# Patient Record
Sex: FEMALE | Race: WHITE | NOT HISPANIC OR LATINO | Employment: STUDENT | URBAN - METROPOLITAN AREA
[De-identification: names, ages, dates, MRNs, and addresses within clinical notes are randomized per-mention and may not be internally consistent; named-entity substitution may affect disease eponyms.]

---
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
## 2023-05-22 ENCOUNTER — PATIENT OUTREACH (OUTPATIENT)
Dept: ADMINISTRATIVE | Facility: HOSPITAL | Age: 23
End: 2023-05-22

## 2024-04-16 ENCOUNTER — HOSPITAL ENCOUNTER (EMERGENCY)
Facility: HOSPITAL | Age: 24
Discharge: HOME | End: 2024-04-16
Attending: EMERGENCY MEDICINE
Payer: COMMERCIAL

## 2024-04-16 VITALS
RESPIRATION RATE: 19 BRPM | TEMPERATURE: 98 F | HEIGHT: 61 IN | SYSTOLIC BLOOD PRESSURE: 133 MMHG | HEART RATE: 88 BPM | BODY MASS INDEX: 23.6 KG/M2 | DIASTOLIC BLOOD PRESSURE: 90 MMHG | WEIGHT: 125 LBS | OXYGEN SATURATION: 98 %

## 2024-04-16 DIAGNOSIS — M25.512 LEFT SHOULDER PAIN: ICD-10-CM

## 2024-04-16 DIAGNOSIS — T14.8XXA MUSCLE STRAIN: Primary | ICD-10-CM

## 2024-04-16 LAB
B-HCG UR QL: NEGATIVE
CTP QC/QA: YES

## 2024-04-16 PROCEDURE — 81025 URINE PREGNANCY TEST: CPT | Mod: ER

## 2024-04-16 PROCEDURE — 96372 THER/PROPH/DIAG INJ SC/IM: CPT

## 2024-04-16 PROCEDURE — 63600175 PHARM REV CODE 636 W HCPCS: Mod: ER

## 2024-04-16 PROCEDURE — 25000003 PHARM REV CODE 250: Mod: ER

## 2024-04-16 PROCEDURE — 99284 EMERGENCY DEPT VISIT MOD MDM: CPT | Mod: 25,ER

## 2024-04-16 RX ORDER — KETOROLAC TROMETHAMINE 30 MG/ML
15 INJECTION, SOLUTION INTRAMUSCULAR; INTRAVENOUS
Status: COMPLETED | OUTPATIENT
Start: 2024-04-16 | End: 2024-04-16

## 2024-04-16 RX ORDER — LIDOCAINE 50 MG/G
1 PATCH TOPICAL DAILY
Qty: 15 PATCH | Refills: 0 | Status: SHIPPED | OUTPATIENT
Start: 2024-04-16

## 2024-04-16 RX ORDER — MELOXICAM 15 MG/1
15 TABLET ORAL DAILY
Qty: 14 TABLET | Refills: 0 | Status: SHIPPED | OUTPATIENT
Start: 2024-04-16 | End: 2024-04-30

## 2024-04-16 RX ORDER — LIDOCAINE 50 MG/G
1 PATCH TOPICAL
Status: DISCONTINUED | OUTPATIENT
Start: 2024-04-16 | End: 2024-04-16 | Stop reason: HOSPADM

## 2024-04-16 RX ORDER — ORPHENADRINE CITRATE 100 MG/1
100 TABLET, EXTENDED RELEASE ORAL 2 TIMES DAILY
Qty: 20 TABLET | Refills: 0 | Status: SHIPPED | OUTPATIENT
Start: 2024-04-16 | End: 2024-04-26

## 2024-04-16 RX ADMIN — LIDOCAINE 1 PATCH: 700 PATCH TOPICAL at 01:04

## 2024-04-16 RX ADMIN — KETOROLAC TROMETHAMINE 15 MG: 30 INJECTION, SOLUTION INTRAMUSCULAR at 01:04

## 2024-04-16 NOTE — DISCHARGE INSTRUCTIONS
You were seen in the emergency department today for left shoulder pain and were diagnosed with muscle strain.  Please take meloxicam, Norflex, and use Lidoderm patches as prescribed for symptoms.  Please rest the next 2-3 days and allow medicine to work.  Drink plenty of fluids.  You may advance workouts as you tolerate it.  It is important to remember that some problems are difficult to diagnose and may not be found during your Emergency Department visit. Be sure to follow up with your primary care doctor and review all labs/imaging/tests that were performed during this visit with them. Some labs/tests may be outside of the normal range and require non-emergent follow-up and further investigation to help diagnose/exclude/prevent complications or other medical conditions. Return to the emergency department for any new or worsening symptoms. Thank you for allowing me to care for you today, it was my pleasure. I hope you get to feeling better soon!

## 2024-04-16 NOTE — ED PROVIDER NOTES
Encounter Date: 4/16/2024       History     Chief Complaint   Patient presents with    Shoulder Pain      Awoke  this am  with left shoulder  soreness- took  ibuprofen  without  relief     Patient is a 23-year-old female with no past medical history who presents to the emergency department for evaluation of atraumatic nonradiating left shoulder pain that began upon awakening this morning.  She does report working out yesterday but did not work out her arms.  She normally works out, this is nothing new.  Denies any other new activity or strenuous activities.  Denies numbness or tingling.  Denies redness or swelling.  Denies fever, chills, nausea, vomiting.  No other complaints.    The history is provided by the patient.     Review of patient's allergies indicates:  No Known Allergies  Past Medical History:   Diagnosis Date    Concussion with no loss of consciousness 12/16/2018    Skin picking habit 9/5/2014     No past surgical history on file.  No family history on file.  Social History     Tobacco Use    Smoking status: Never    Smokeless tobacco: Never   Substance Use Topics    Alcohol use: No    Drug use: No     Review of Systems   Constitutional:  Negative for chills and fever.   Respiratory:  Negative for shortness of breath.    Cardiovascular:  Negative for chest pain.   Gastrointestinal:  Negative for abdominal pain, nausea and vomiting.   Musculoskeletal:  Positive for arthralgias. Negative for joint swelling, neck pain and neck stiffness.   Skin:  Negative for color change.   Neurological:  Negative for dizziness, light-headedness, numbness and headaches.       Physical Exam     Initial Vitals [04/16/24 1230]   BP Pulse Resp Temp SpO2   (!) 133/90 96 18 97.9 °F (36.6 °C) 99 %      MAP       --         Physical Exam    Nursing note and vitals reviewed.  Constitutional: She appears well-developed and well-nourished.   HENT:   Head: Normocephalic and atraumatic.   Right Ear: External ear normal.   Left Ear:  External ear normal.   Neck: Carotid bruit is not present.   Normal range of motion.  Cardiovascular:  Normal rate, regular rhythm, normal heart sounds and intact distal pulses.     Exam reveals no gallop and no friction rub.       No murmur heard.  Pulmonary/Chest: Breath sounds normal. No respiratory distress. She has no wheezes. She has no rhonchi. She has no rales.   Abdominal: Abdomen is soft. Bowel sounds are normal. She exhibits no distension. There is no abdominal tenderness. There is no rebound and no guarding.   Musculoskeletal:         General: Normal range of motion.      Cervical back: Normal range of motion.      Comments: There is normal passive range of motion of the left shoulder.  There is tenderness over the left trapezius muscle.  No obvious deformity, swelling, erythema when compared to contralateral upper extremity.  Sensation intact.  Neurovascularly intact.     Neurological: She is alert and oriented to person, place, and time. GCS score is 15. GCS eye subscore is 4. GCS verbal subscore is 5. GCS motor subscore is 6.   Psychiatric: She has a normal mood and affect.         ED Course   Procedures  Labs Reviewed   POCT URINE PREGNANCY          Imaging Results              X-Ray Shoulder Trauma Left (Final result)  Result time 04/16/24 13:12:06      Final result by Dwain Joseph MD (04/16/24 13:12:06)                   Impression:      1. No acute displaced fracture, dislocation or suspicious osseous lesion.      Electronically signed by: Dwain Joseph  Date:    04/16/2024  Time:    13:12               Narrative:    EXAMINATION:  XR SHOULDER TRAUMA 3 VIEW LEFT    CLINICAL HISTORY:  Left shoulder pain    TECHNIQUE:  3 views of the left shoulder    COMPARISON  None    FINDINGS:  No acute displaced fracture, dislocation or suspicious osseous lesion. Anatomic alignment is maintained.    Regional soft tissues are grossly unremarkable.                                       Medications   LIDOcaine 5  % patch 1 patch (has no administration in time range)   ketorolac injection 15 mg (15 mg Intramuscular Given 4/16/24 1325)     Medical Decision Making  This is an emergent evaluation of a 23-year-old female with no past medical history who presents to the emergency department for evaluation of atraumatic nonradiating left shoulder pain that began upon awakening this morning. .    Patient looks well clinically. There is normal passive range of motion of the left shoulder.  There is tenderness over the left trapezius muscle.  No obvious deformity, swelling, erythema when compared to contralateral upper extremity.  Sensation intact.  Neurovascularly intact. Regular rate rhythm without murmurs.  No carotid bruits appreciated on exam. Lungs are clear to auscultation bilaterally.  Abdomen is soft, nontender, non distended, with normal bowel sounds.     Differential diagnosis includes but is not limited to fracture, dislocation, muscle strain.  Considered septic joint but doubtful.    Workup initiated with x-ray of left shoulder.  Ordered Toradol, Lidoderm patch.  Vital signs, chart, labs, and/or imaging were all reviewed.  See ED course below and interpretations above. My overall impression is muscle strain. Will discharge home with meloxicam, Norflex, Lidoderm patches. Patient is very well appearing, and in no acute distress. Vital signs are reassuring here in the emergency department, patient is afebrile, breathing comfortable, satting 99 % on room air. Patient/Caregiver is stable for discharge at this time.  Patient/Caregiver was informed of results and plan of care. Patient/Caregiver verbalized understanding of care plan. All questions and concerns were addressed. Discussed strict return precautions with the patient/caregiver. Instructed follow up with primary care provider within 1 week.      Shlomo Dennison PA-C    DISCLAIMER: This note was prepared with [employer] voice recognition transcription software. Anum  syntax, mangled pronouns, and other bizarre constructions may be attributed to that software system.      Amount and/or Complexity of Data Reviewed  Labs: ordered.  Radiology: ordered. Decision-making details documented in ED Course.    Risk  Prescription drug management.               ED Course as of 04/16/24 1328   Tue Apr 16, 2024   1317 X-Ray Shoulder Trauma Left  No acute displaced fracture, dislocation or suspicious osseous lesion. Anatomic alignment is maintained.     Regional soft tissues are grossly unremarkable.      [TM]      ED Course User Index  [TM] Shlomo Dennison PA-C                           Clinical Impression:  Final diagnoses:  [M25.512] Left shoulder pain  [T14.8XXA] Muscle strain (Primary)          ED Disposition Condition    Discharge Stable          ED Prescriptions       Medication Sig Dispense Start Date End Date Auth. Provider    meloxicam (MOBIC) 15 MG tablet Take 1 tablet (15 mg total) by mouth once daily. for 14 days 14 tablet 4/16/2024 4/30/2024 Shlomo Dennison PA-C    orphenadrine (NORFLEX) 100 mg tablet Take 1 tablet (100 mg total) by mouth 2 (two) times daily. for 10 days 20 tablet 4/16/2024 4/26/2024 Shlomo Dennison PA-C    LIDOcaine (LIDODERM) 5 % Place 1 patch onto the skin once daily. Remove & Discard patch within 12 hours or as directed by MD 15 patch 4/16/2024 -- Shlomo Dennison PA-C          Follow-up Information       Follow up With Specialties Details Why Contact Info    [facility] ED Emergency Medicine Go to  As needed, If symptoms worsen, or new symptoms develop [address] [facility] [address] [phone]             Shlomo Dennison PA-C  04/16/24 [id number]

## 2024-04-16 NOTE — Clinical Note
"Sonal EDOUARD" Ewa was seen and treated in our emergency department on 4/16/2024.  She may return to work on 04/19/2024.       If you have any questions or concerns, please don't hesitate to call.      Shlomo Dennison PA-C"

## 2024-04-18 ENCOUNTER — OFFICE VISIT (OUTPATIENT)
Dept: INTERNAL MEDICINE | Facility: CLINIC | Age: 24
End: 2024-04-18
Payer: COMMERCIAL

## 2024-04-18 ENCOUNTER — LAB VISIT (OUTPATIENT)
Dept: LAB | Facility: OTHER | Age: 24
End: 2024-04-18
Attending: FAMILY MEDICINE
Payer: COMMERCIAL

## 2024-04-18 VITALS
DIASTOLIC BLOOD PRESSURE: 67 MMHG | SYSTOLIC BLOOD PRESSURE: 113 MMHG | OXYGEN SATURATION: 99 % | BODY MASS INDEX: 24.01 KG/M2 | WEIGHT: 127.19 LBS | HEART RATE: 76 BPM | HEIGHT: 61 IN

## 2024-04-18 DIAGNOSIS — Z11.3 SCREEN FOR STD (SEXUALLY TRANSMITTED DISEASE): ICD-10-CM

## 2024-04-18 DIAGNOSIS — Z11.3 SCREEN FOR STD (SEXUALLY TRANSMITTED DISEASE): Primary | ICD-10-CM

## 2024-04-18 LAB
HIV 1+2 AB+HIV1 P24 AG SERPL QL IA: NEGATIVE
TREPONEMA PALLIDUM IGG+IGM AB [PRESENCE] IN SERUM OR PLASMA BY IMMUNOASSAY: NONREACTIVE

## 2024-04-18 PROCEDURE — 99385 PREV VISIT NEW AGE 18-39: CPT | Mod: S$GLB,,, | Performed by: FAMILY MEDICINE

## 2024-04-18 PROCEDURE — 99999 PR PBB SHADOW E&M-EST. PATIENT-LVL III: CPT | Mod: PBBFAC,,, | Performed by: FAMILY MEDICINE

## 2024-04-18 PROCEDURE — 3008F BODY MASS INDEX DOCD: CPT | Mod: CPTII,S$GLB,, | Performed by: FAMILY MEDICINE

## 2024-04-18 PROCEDURE — 87389 HIV-1 AG W/HIV-1&-2 AB AG IA: CPT | Performed by: FAMILY MEDICINE

## 2024-04-18 PROCEDURE — 3078F DIAST BP <80 MM HG: CPT | Mod: CPTII,S$GLB,, | Performed by: FAMILY MEDICINE

## 2024-04-18 PROCEDURE — 3074F SYST BP LT 130 MM HG: CPT | Mod: CPTII,S$GLB,, | Performed by: FAMILY MEDICINE

## 2024-04-18 PROCEDURE — 86593 SYPHILIS TEST NON-TREP QUANT: CPT | Performed by: FAMILY MEDICINE

## 2024-04-18 PROCEDURE — 1159F MED LIST DOCD IN RCRD: CPT | Mod: CPTII,S$GLB,, | Performed by: FAMILY MEDICINE

## 2024-04-18 PROCEDURE — 36415 COLL VENOUS BLD VENIPUNCTURE: CPT | Performed by: FAMILY MEDICINE

## 2024-04-18 NOTE — PROGRESS NOTES
"Subjective:       Patient ID: Sonal Mcneil is a 23 y.o. female.    Chief Complaint: Annual Exam    HPI  History of Present Illness    Came in today for annual/est care. Mainly wanted to get STD screening after learning of infidelity of last partner. She has no symptoms at all.     Tests to Keep You Healthy    Cervical Cancer Screening: DUE      Social History     Social History Narrative    Not on file       No family history on file.    Current Outpatient Medications:     etonogestreL (NEXPLANON) 68 mg Impl subdermal device, Inject 1 each into the skin., Disp: , Rfl:     LIDOcaine (LIDODERM) 5 %, Place 1 patch onto the skin once daily. Remove & Discard patch within 12 hours or as directed by MD, Disp: 15 patch, Rfl: 0    meloxicam (MOBIC) 15 MG tablet, Take 1 tablet (15 mg total) by mouth once daily. for 14 days, Disp: 14 tablet, Rfl: 0    triamcinolone acetonide 0.1% (KENALOG) 0.1 % cream, Apply topically 2 (two) times daily., Disp: 30 g, Rfl: 0    Review of Systems   Constitutional:  Negative for chills and fever.   Respiratory:  Negative for cough and shortness of breath.    Cardiovascular:  Negative for chest pain.   Gastrointestinal:  Negative for abdominal pain.   Skin:  Negative for rash.   Neurological:  Negative for dizziness.       Objective:   /67 (BP Location: Left arm, Patient Position: Sitting)   Pulse 76   Ht 5' 1" (1.549 m)   Wt 57.7 kg (127 lb 3.3 oz)   LMP  (LMP Unknown)   SpO2 99%   BMI 24.04 kg/m²      Physical Exam  Vitals reviewed.   Constitutional:       Appearance: She is well-developed.   HENT:      Head: Normocephalic and atraumatic.   Eyes:      Conjunctiva/sclera: Conjunctivae normal.   Cardiovascular:      Rate and Rhythm: Normal rate.   Pulmonary:      Effort: Pulmonary effort is normal. No respiratory distress.   Skin:     General: Skin is warm and dry.      Findings: No rash.   Neurological:      Mental Status: She is alert and oriented to person, place, and time.      " Coordination: Coordination normal.   Psychiatric:         Behavior: Behavior normal.         Physical Exam      @resultssec@  Assessment & Plan   Assessment & Plan      Problem List Items Addressed This Visit    None  Visit Diagnoses       Screen for STD (sexually transmitted disease)    -  Primary    Relevant Orders    HIV 1/2 Ag/Ab (4th Gen) (Completed)    C. trachomatis/N. gonorrhoeae by AMP DNA [facility]; Vagina (Completed)    Treponema Pallidium Antibodies IgG, IgM (Completed)              Immunizations Administered on Date of Encounter - 4/18/2024       No immunizations on file.             No follow-ups on file.          Disclaimer:  This note may have been prepared using voice recognition software, it may have not been extensively proofed, as such there could be errors within the text such as sound alike errors.

## 2024-04-19 ENCOUNTER — TELEPHONE (OUTPATIENT)
Dept: INTERNAL MEDICINE | Facility: CLINIC | Age: 24
End: 2024-04-19
Payer: COMMERCIAL

## 2024-04-19 DIAGNOSIS — Z11.3 SCREEN FOR STD (SEXUALLY TRANSMITTED DISEASE): Primary | ICD-10-CM

## 2024-04-19 NOTE — TELEPHONE ENCOUNTER
----- Message from Justyna Hilton sent at 4/19/2024 12:06 PM CDT -----  Regarding: [facility] cancelled  Type: Patient Call Back    Who called: [facility], [phone]     What is the request in detail: calling b/c ctgc has to be cancelled for p/t b/c it was contaminated. Please call to assist

## 2024-04-19 NOTE — TELEPHONE ENCOUNTER
Received call from Lab  Maggie Larsen. States Chlamydia Swab submitted was contaminated and test cancelled. (Swab was not placed within solution but into wrapping)

## 2024-04-19 NOTE — TELEPHONE ENCOUNTER
----- Message from Justyna Hilton sent at 4/19/2024 12:06 PM CDT -----  Regarding: [facility] cancelled  Type: Patient Call Back    Who called: [facility], [phone]     What is the request in detail: calling b/c ctgc has to be cancelled for p/t b/c it was contaminated. Please call to assist

## 2024-04-19 NOTE — TELEPHONE ENCOUNTER
Let's just have her do a urine test.   I think she lives on [facility] so can maybe do that over there.

## 2024-04-26 ENCOUNTER — LAB VISIT (OUTPATIENT)
Dept: LAB | Facility: HOSPITAL | Age: 24
End: 2024-04-26
Attending: FAMILY MEDICINE
Payer: COMMERCIAL

## 2024-04-26 DIAGNOSIS — Z11.3 SCREEN FOR STD (SEXUALLY TRANSMITTED DISEASE): ICD-10-CM

## 2024-04-26 PROCEDURE — 87491 CHLMYD TRACH DNA AMP PROBE: CPT | Performed by: FAMILY MEDICINE

## 2024-04-28 LAB
C TRACH DNA SPEC QL NAA+PROBE: NOT DETECTED
N GONORRHOEA DNA SPEC QL NAA+PROBE: NOT DETECTED

## 2024-06-10 ENCOUNTER — PATIENT MESSAGE (OUTPATIENT)
Dept: INTERNAL MEDICINE | Facility: CLINIC | Age: 24
End: 2024-06-10
Payer: COMMERCIAL

## 2024-06-23 NOTE — TELEPHONE ENCOUNTER
Scheduled lab appt.   You have been evaluated in the Emergency Department today for fall. Your evaluation, including CT head and C-spine, did not show evidence of conditions requiring further emergent medical intervention at this time.  You did not have any wounds that required intervention.    Please schedule an appointment with your primary care physician in 1 week as needed.    Return to the Emergency Department or seek care immediately if you experience:  Worsening periods of confusion,   Feeling faint or passing out   Numbness, tingling, or weakness  Trouble speaking  Vision changes  Chest pain  Shortness of breath  Fevers 100.4°F or greater not controlled by medicine  Or any other concerning symptoms that you feel need to be evaluated     Thank you for choosing us for your care.    No future appointments.

## 2025-04-22 ENCOUNTER — E-VISIT (OUTPATIENT)
Dept: URGENT CARE | Facility: CLINIC | Age: 25
End: 2025-04-22
Payer: COMMERCIAL

## 2025-04-22 DIAGNOSIS — N30.00 ACUTE CYSTITIS WITHOUT HEMATURIA: Primary | ICD-10-CM

## 2025-04-22 RX ORDER — PHENAZOPYRIDINE HYDROCHLORIDE 200 MG/1
200 TABLET, FILM COATED ORAL 3 TIMES DAILY PRN
Qty: 15 TABLET | Refills: 0 | Status: SHIPPED | OUTPATIENT
Start: 2025-04-22 | End: 2025-04-25

## 2025-04-22 RX ORDER — NITROFURANTOIN 25; 75 MG/1; MG/1
100 CAPSULE ORAL 2 TIMES DAILY
Qty: 10 CAPSULE | Refills: 0 | Status: SHIPPED | OUTPATIENT
Start: 2025-04-22 | End: 2025-04-27

## 2025-04-22 NOTE — PROGRESS NOTES
Patient ID: Sonal Mcneil is a 24 y.o. female.    Chief Complaint: General Illness (Entered automatically based on patient selection in [employer].)    The patient initiated a request through [employer] on 4/22/2025 for evaluation and management with a chief complaint of General Illness (Entered automatically based on patient selection in [employer].)     I evaluated the questionnaire submission on 04/22/2025 .    [facility]    4/22/2025 10:38 AM CDT - Filed by Patient   What do you need help with? Urinary Symptoms   Do you agree to participate in an E-Visit? Yes   If you have any of the following symptoms, please go to the nearest emergency room or call 911: I acknowledge   Do you have any of the following pregnancy-related conditions? None   What is the main issue you would like addressed today? Treatment medication for my UTI   Do you have any of the following symptoms? Discomfort or pain passing urine;  Passing urine more frequently   When did your concern begin? 4/14/2025   What medications or treatments have you used to help your symptoms? Cranberry products;  Drinking more fluids   What does your urine look like? Clear   Have you had any of the following symptoms during the past 24 hours?   Urgency (a sudden and uncontrollable urge to urinate) Severe   Frequency (going to the toilet very often) Severe   Burning pain when urinating Mild   Incomplete bladder emptying (still feel like you need to urinate again after urination) (None, Mild, Moderate, Severe) Severe   Pain in the lower belly when youre not urinating. None   Discomfort from your urinary symptoms. Moderate   Have you had any of the following symptoms during the past 24 hours?   Blood seen in the urine None   Pain in the lower back on one or both sides (flank pain) Mild   Abnormal Vaginal Discharge (abnormal amount, color and/or odor) None   Discharge from the opening you urinate from (urethra) when not urinating. None   Have  your symptoms interfered with your every day activities? Moderate   Do you have a fever? No   Are you a diabetic? No   Are you currently experiencing any of the following while completing this questionnaire? Menstrual period   Do you have a history of kidney stones? No   Provide any additional information you feel is important.    Please attach any relevant images or files (if you have performed a home test for UTI, please submit a photo of results)    Are you able to take your vital signs? No         Encounter Diagnosis   Name Primary?    Acute cystitis without hematuria Yes        No orders of the defined types were placed in this encounter.     Medications Ordered This Encounter   Medications    nitrofurantoin, macrocrystal-monohydrate, (MACROBID) 100 MG capsule     Sig: Take 1 capsule (100 mg total) by mouth 2 (two) times daily. for 5 days     Dispense:  10 capsule     Refill:  0    phenazopyridine (PYRIDIUM) 200 MG tablet     Sig: Take 1 tablet (200 mg total) by mouth 3 (three) times daily as needed for Pain.     Dispense:  15 tablet     Refill:  0        No follow-ups on file.      E-Visit Time Tracking:    Day 1 Time (in minutes): 5    Total Time (in minutes): 5